# Patient Record
Sex: FEMALE | Race: WHITE | NOT HISPANIC OR LATINO
[De-identification: names, ages, dates, MRNs, and addresses within clinical notes are randomized per-mention and may not be internally consistent; named-entity substitution may affect disease eponyms.]

---

## 2018-09-25 ENCOUNTER — APPOINTMENT (OUTPATIENT)
Dept: BARIATRICS | Facility: CLINIC | Age: 53
End: 2018-09-25
Payer: COMMERCIAL

## 2018-09-25 ENCOUNTER — OUTPATIENT (OUTPATIENT)
Dept: OUTPATIENT SERVICES | Facility: HOSPITAL | Age: 53
LOS: 1 days | End: 2018-09-25
Payer: COMMERCIAL

## 2018-09-25 VITALS
DIASTOLIC BLOOD PRESSURE: 90 MMHG | HEART RATE: 77 BPM | HEIGHT: 62 IN | OXYGEN SATURATION: 99 % | WEIGHT: 172 LBS | SYSTOLIC BLOOD PRESSURE: 130 MMHG | BODY MASS INDEX: 31.65 KG/M2

## 2018-09-25 DIAGNOSIS — R63.5 ABNORMAL WEIGHT GAIN: ICD-10-CM

## 2018-09-25 DIAGNOSIS — Z98.84 BARIATRIC SURGERY STATUS: Chronic | ICD-10-CM

## 2018-09-25 DIAGNOSIS — E66.3 OVERWEIGHT: ICD-10-CM

## 2018-09-25 DIAGNOSIS — Z87.19 PERSONAL HISTORY OF OTHER DISEASES OF THE DIGESTIVE SYSTEM: ICD-10-CM

## 2018-09-25 DIAGNOSIS — E66.9 OBESITY, UNSPECIFIED: ICD-10-CM

## 2018-09-25 DIAGNOSIS — Z98.84 BARIATRIC SURGERY STATUS: ICD-10-CM

## 2018-09-25 PROCEDURE — 99214 OFFICE O/P EST MOD 30 MIN: CPT

## 2018-09-25 PROCEDURE — 74220 X-RAY XM ESOPHAGUS 1CNTRST: CPT

## 2018-09-25 PROCEDURE — 74220 X-RAY XM ESOPHAGUS 1CNTRST: CPT | Mod: 26

## 2018-09-25 RX ORDER — BACILLUS COAGULANS/INULIN 1B-250 MG
CAPSULE ORAL
Refills: 0 | Status: ACTIVE | COMMUNITY

## 2018-09-25 RX ORDER — MULTIVITAMIN
TABLET ORAL
Refills: 0 | Status: ACTIVE | COMMUNITY

## 2018-10-19 ENCOUNTER — APPOINTMENT (OUTPATIENT)
Dept: BARIATRICS | Facility: CLINIC | Age: 53
End: 2018-10-19
Payer: COMMERCIAL

## 2018-10-19 ENCOUNTER — OUTPATIENT (OUTPATIENT)
Dept: OUTPATIENT SERVICES | Facility: HOSPITAL | Age: 53
LOS: 1 days | End: 2018-10-19
Payer: COMMERCIAL

## 2018-10-19 VITALS
OXYGEN SATURATION: 98 % | WEIGHT: 173.06 LBS | HEART RATE: 97 BPM | SYSTOLIC BLOOD PRESSURE: 132 MMHG | DIASTOLIC BLOOD PRESSURE: 82 MMHG | HEIGHT: 62 IN | BODY MASS INDEX: 31.85 KG/M2

## 2018-10-19 VITALS
WEIGHT: 169.76 LBS | TEMPERATURE: 98 F | HEIGHT: 63 IN | OXYGEN SATURATION: 99 % | SYSTOLIC BLOOD PRESSURE: 120 MMHG | DIASTOLIC BLOOD PRESSURE: 88 MMHG | HEART RATE: 93 BPM

## 2018-10-19 DIAGNOSIS — T85.598A OTHER MECHANICAL COMPLICATION OF OTHER GASTROINTESTINAL PROSTHETIC DEVICES, IMPLANTS AND GRAFTS, INITIAL ENCOUNTER: ICD-10-CM

## 2018-10-19 DIAGNOSIS — Z98.84 BARIATRIC SURGERY STATUS: Chronic | ICD-10-CM

## 2018-10-19 DIAGNOSIS — Z98.84 BARIATRIC SURGERY STATUS: ICD-10-CM

## 2018-10-19 DIAGNOSIS — Z01.818 ENCOUNTER FOR OTHER PREPROCEDURAL EXAMINATION: ICD-10-CM

## 2018-10-19 DIAGNOSIS — K95.09 OTHER COMPLICATIONS OF GASTRIC BAND PROCEDURE: ICD-10-CM

## 2018-10-19 DIAGNOSIS — Z41.1 ENCOUNTER FOR COSMETIC SURGERY: Chronic | ICD-10-CM

## 2018-10-19 LAB
ANION GAP SERPL CALC-SCNC: 6 MMOL/L — SIGNIFICANT CHANGE UP (ref 5–17)
BLD GP AB SCN SERPL QL: SIGNIFICANT CHANGE UP
BUN SERPL-MCNC: 13 MG/DL — SIGNIFICANT CHANGE UP (ref 7–23)
CALCIUM SERPL-MCNC: 9.9 MG/DL — SIGNIFICANT CHANGE UP (ref 8.4–10.5)
CHLORIDE SERPL-SCNC: 103 MMOL/L — SIGNIFICANT CHANGE UP (ref 96–108)
CO2 SERPL-SCNC: 30 MMOL/L — SIGNIFICANT CHANGE UP (ref 22–31)
CREAT SERPL-MCNC: 0.73 MG/DL — SIGNIFICANT CHANGE UP (ref 0.5–1.3)
GLUCOSE SERPL-MCNC: 86 MG/DL — SIGNIFICANT CHANGE UP (ref 70–99)
HCT VFR BLD CALC: 43.9 % — SIGNIFICANT CHANGE UP (ref 34.5–45)
HGB BLD-MCNC: 14.6 G/DL — SIGNIFICANT CHANGE UP (ref 11.5–15.5)
MCHC RBC-ENTMCNC: 30.2 PG — SIGNIFICANT CHANGE UP (ref 27–34)
MCHC RBC-ENTMCNC: 33.3 GM/DL — SIGNIFICANT CHANGE UP (ref 32–36)
MCV RBC AUTO: 90.9 FL — SIGNIFICANT CHANGE UP (ref 80–100)
NRBC # BLD: 0 /100 WBCS — SIGNIFICANT CHANGE UP (ref 0–0)
PLATELET # BLD AUTO: 271 K/UL — SIGNIFICANT CHANGE UP (ref 150–400)
POTASSIUM SERPL-MCNC: 4.4 MMOL/L — SIGNIFICANT CHANGE UP (ref 3.5–5.3)
POTASSIUM SERPL-SCNC: 4.4 MMOL/L — SIGNIFICANT CHANGE UP (ref 3.5–5.3)
RBC # BLD: 4.83 M/UL — SIGNIFICANT CHANGE UP (ref 3.8–5.2)
RBC # FLD: 12.3 % — SIGNIFICANT CHANGE UP (ref 10.3–14.5)
SODIUM SERPL-SCNC: 139 MMOL/L — SIGNIFICANT CHANGE UP (ref 135–145)
WBC # BLD: 8.52 K/UL — SIGNIFICANT CHANGE UP (ref 3.8–10.5)
WBC # FLD AUTO: 8.52 K/UL — SIGNIFICANT CHANGE UP (ref 3.8–10.5)

## 2018-10-19 PROCEDURE — 85027 COMPLETE CBC AUTOMATED: CPT

## 2018-10-19 PROCEDURE — 86850 RBC ANTIBODY SCREEN: CPT

## 2018-10-19 PROCEDURE — G0463: CPT

## 2018-10-19 PROCEDURE — 93005 ELECTROCARDIOGRAM TRACING: CPT

## 2018-10-19 PROCEDURE — 80048 BASIC METABOLIC PNL TOTAL CA: CPT

## 2018-10-19 PROCEDURE — 36415 COLL VENOUS BLD VENIPUNCTURE: CPT

## 2018-10-19 PROCEDURE — 86900 BLOOD TYPING SEROLOGIC ABO: CPT

## 2018-10-19 PROCEDURE — 99214 OFFICE O/P EST MOD 30 MIN: CPT

## 2018-10-19 PROCEDURE — 86901 BLOOD TYPING SEROLOGIC RH(D): CPT

## 2018-10-19 PROCEDURE — 93010 ELECTROCARDIOGRAM REPORT: CPT

## 2018-10-19 RX ORDER — LOSARTAN POTASSIUM AND HYDROCHLOROTHIAZIDE 12.5; 5 MG/1; MG/1
50-12.5 TABLET ORAL
Refills: 0 | Status: ACTIVE | COMMUNITY

## 2018-10-19 NOTE — H&P PST ADULT - BREASTS
Denies known Latex allergy or symptoms of Latex sensitivity.  Medications verified, no changes.  History   Smoking Status   • Current Every Day Smoker   • Packs/day: 0.50   • Years: 10.00   • Types: Cigarettes   Smokeless Tobacco   • Never Used     Pt here for sore throat on R side  Onset yesterday   No cough      not examined

## 2018-10-19 NOTE — H&P PST ADULT - FAMILY HISTORY
Family history of heart disease     Father  Still living? No  Family history of hypertension, Age at diagnosis: Age Unknown     Mother  Still living? Yes, Estimated age: 71-80  Family history of hypertension, Age at diagnosis: 71-80

## 2018-10-19 NOTE — H&P PST ADULT - HISTORY OF PRESENT ILLNESS
This is a 54 y/o female who had undergone gastric band 2014 presents for removal lap band patient states that she was feelng hungry and This is a 54 y/o female who had undergone gastric band 2014 presents with complaint of increased hunger for several months. Denies dysphagia, port site pain, vomiting  or acid reflux.  Evaluated  by Dr Garcia, referred for endoscopy which revealed lap band erosion . Recommended removal of lap band . scheduled for laparoscopic removal of lap band on 10/24/18

## 2018-10-19 NOTE — H&P PST ADULT - NSANTHOSAYNRD_GEN_A_CORE
No. ANDRÉS screening performed.  STOP BANG Legend: 0-2 = LOW Risk; 3-4 = INTERMEDIATE Risk; 5-8 = HIGH Risk

## 2018-10-19 NOTE — H&P PST ADULT - PSH
Gastric banding status  with greater curvature plication   Plastic surgery for unacceptable cosmetic appearance  brachioplsty both arms  S/P abdominoplasty    S/P     S/P oophorectomy  (left ovary) Gastric banding status  with greater curvature plication   Plastic surgery for unacceptable cosmetic appearance  brachioplasty both arms 2017  S/P abdominoplasty    S/P     S/P oophorectomy  (left ovary)

## 2018-10-19 NOTE — H&P PST ADULT - PMH
GERD (gastroesophageal reflux disease)    Hiatal hernia    Hyperlipidemia    Hypertension    Obesity (BMI 35.0-39.9 without comorbidity) GERD (gastroesophageal reflux disease)    Hiatal hernia    Hyperlipidemia    Hypertension

## 2018-10-23 ENCOUNTER — TRANSCRIPTION ENCOUNTER (OUTPATIENT)
Age: 53
End: 2018-10-23

## 2018-10-23 NOTE — PATIENT PROFILE ADULT - REASON FOR REFUSAL (REFER PATIENT TO HEALTHCARE PROVIDER FOR FOLLOW-UP):
Pt home ambulatory with discharge instructions, pt verbalizes understanding of them   pt does not want it

## 2018-10-23 NOTE — PATIENT PROFILE ADULT - FUNCTIONAL SCREEN CURRENT LEVEL: BATHING, MLM
Addended by: ERIKA CASTANEDA on: 6/19/2017 02:03 PM     Modules accepted: Orders     4 = completely dependent

## 2018-10-24 ENCOUNTER — INPATIENT (INPATIENT)
Facility: HOSPITAL | Age: 53
LOS: 3 days | Discharge: HOME | DRG: 337 | End: 2018-10-28
Attending: SURGERY | Admitting: SURGERY
Payer: COMMERCIAL

## 2018-10-24 ENCOUNTER — RESULT REVIEW (OUTPATIENT)
Age: 53
End: 2018-10-24

## 2018-10-24 ENCOUNTER — APPOINTMENT (OUTPATIENT)
Dept: BARIATRICS | Facility: HOSPITAL | Age: 53
End: 2018-10-24
Payer: COMMERCIAL

## 2018-10-24 VITALS
HEART RATE: 80 BPM | WEIGHT: 169.76 LBS | RESPIRATION RATE: 13 BRPM | DIASTOLIC BLOOD PRESSURE: 85 MMHG | TEMPERATURE: 98 F | OXYGEN SATURATION: 100 % | SYSTOLIC BLOOD PRESSURE: 132 MMHG

## 2018-10-24 DIAGNOSIS — T85.598A OTHER MECHANICAL COMPLICATION OF OTHER GASTROINTESTINAL PROSTHETIC DEVICES, IMPLANTS AND GRAFTS, INITIAL ENCOUNTER: ICD-10-CM

## 2018-10-24 DIAGNOSIS — Z98.84 BARIATRIC SURGERY STATUS: ICD-10-CM

## 2018-10-24 DIAGNOSIS — Z98.84 BARIATRIC SURGERY STATUS: Chronic | ICD-10-CM

## 2018-10-24 DIAGNOSIS — Z41.1 ENCOUNTER FOR COSMETIC SURGERY: Chronic | ICD-10-CM

## 2018-10-24 LAB — HCG UR QL: NEGATIVE — SIGNIFICANT CHANGE UP

## 2018-10-24 PROCEDURE — 43774 LAP RMVL GASTR ADJ ALL PARTS: CPT | Mod: AS

## 2018-10-24 PROCEDURE — 43774 LAP RMVL GASTR ADJ ALL PARTS: CPT

## 2018-10-24 PROCEDURE — 43659 UNLISTED LAPS PX STOMACH: CPT | Mod: AS

## 2018-10-24 PROCEDURE — 88302 TISSUE EXAM BY PATHOLOGIST: CPT | Mod: 26

## 2018-10-24 PROCEDURE — 43659 UNLISTED LAPS PX STOMACH: CPT

## 2018-10-24 RX ORDER — CEFAZOLIN SODIUM 1 G
2000 VIAL (EA) INJECTION ONCE
Qty: 0 | Refills: 0 | Status: COMPLETED | OUTPATIENT
Start: 2018-10-24 | End: 2018-10-24

## 2018-10-24 RX ORDER — ACETAMINOPHEN 500 MG
1000 TABLET ORAL EVERY 6 HOURS
Qty: 0 | Refills: 0 | Status: COMPLETED | OUTPATIENT
Start: 2018-10-24 | End: 2018-10-25

## 2018-10-24 RX ORDER — ONDANSETRON 8 MG/1
4 TABLET, FILM COATED ORAL EVERY 6 HOURS
Qty: 0 | Refills: 0 | Status: DISCONTINUED | OUTPATIENT
Start: 2018-10-24 | End: 2018-10-28

## 2018-10-24 RX ORDER — IBUPROFEN 200 MG
800 TABLET ORAL EVERY 6 HOURS
Qty: 0 | Refills: 0 | Status: DISCONTINUED | OUTPATIENT
Start: 2018-10-24 | End: 2018-10-28

## 2018-10-24 RX ORDER — SODIUM CHLORIDE 9 MG/ML
1000 INJECTION, SOLUTION INTRAVENOUS
Qty: 0 | Refills: 0 | Status: DISCONTINUED | OUTPATIENT
Start: 2018-10-24 | End: 2018-10-24

## 2018-10-24 RX ORDER — ENOXAPARIN SODIUM 100 MG/ML
40 INJECTION SUBCUTANEOUS ONCE
Qty: 0 | Refills: 0 | Status: COMPLETED | OUTPATIENT
Start: 2018-10-24 | End: 2018-10-24

## 2018-10-24 RX ORDER — HYDROMORPHONE HYDROCHLORIDE 2 MG/ML
0.5 INJECTION INTRAMUSCULAR; INTRAVENOUS; SUBCUTANEOUS EVERY 4 HOURS
Qty: 0 | Refills: 0 | Status: DISCONTINUED | OUTPATIENT
Start: 2018-10-24 | End: 2018-10-28

## 2018-10-24 RX ORDER — HYDROMORPHONE HYDROCHLORIDE 2 MG/ML
0.5 INJECTION INTRAMUSCULAR; INTRAVENOUS; SUBCUTANEOUS
Qty: 0 | Refills: 0 | Status: DISCONTINUED | OUTPATIENT
Start: 2018-10-24 | End: 2018-10-24

## 2018-10-24 RX ORDER — APREPITANT 80 MG/1
40 CAPSULE ORAL ONCE
Qty: 0 | Refills: 0 | Status: COMPLETED | OUTPATIENT
Start: 2018-10-24 | End: 2018-10-24

## 2018-10-24 RX ORDER — METRONIDAZOLE 500 MG
500 TABLET ORAL EVERY 8 HOURS
Qty: 0 | Refills: 0 | Status: COMPLETED | OUTPATIENT
Start: 2018-10-24 | End: 2018-10-26

## 2018-10-24 RX ORDER — SODIUM CHLORIDE 9 MG/ML
1000 INJECTION, SOLUTION INTRAVENOUS
Qty: 0 | Refills: 0 | Status: DISCONTINUED | OUTPATIENT
Start: 2018-10-24 | End: 2018-10-26

## 2018-10-24 RX ORDER — ONDANSETRON 8 MG/1
4 TABLET, FILM COATED ORAL ONCE
Qty: 0 | Refills: 0 | Status: DISCONTINUED | OUTPATIENT
Start: 2018-10-24 | End: 2018-10-24

## 2018-10-24 RX ORDER — CHLORHEXIDINE GLUCONATE 213 G/1000ML
1 SOLUTION TOPICAL ONCE
Qty: 0 | Refills: 0 | Status: COMPLETED | OUTPATIENT
Start: 2018-10-24 | End: 2018-10-24

## 2018-10-24 RX ORDER — PANTOPRAZOLE SODIUM 20 MG/1
40 TABLET, DELAYED RELEASE ORAL DAILY
Qty: 0 | Refills: 0 | Status: DISCONTINUED | OUTPATIENT
Start: 2018-10-24 | End: 2018-10-25

## 2018-10-24 RX ORDER — ENOXAPARIN SODIUM 100 MG/ML
40 INJECTION SUBCUTANEOUS EVERY 12 HOURS
Qty: 0 | Refills: 0 | Status: DISCONTINUED | OUTPATIENT
Start: 2018-10-24 | End: 2018-10-28

## 2018-10-24 RX ORDER — ACETAMINOPHEN 500 MG
1000 TABLET ORAL EVERY 6 HOURS
Qty: 0 | Refills: 0 | Status: DISCONTINUED | OUTPATIENT
Start: 2018-10-25 | End: 2018-10-25

## 2018-10-24 RX ORDER — HYOSCYAMINE SULFATE 0.13 MG
0.12 TABLET ORAL EVERY 6 HOURS
Qty: 0 | Refills: 0 | Status: DISCONTINUED | OUTPATIENT
Start: 2018-10-24 | End: 2018-10-28

## 2018-10-24 RX ORDER — ACETAMINOPHEN 500 MG
1000 TABLET ORAL ONCE
Qty: 0 | Refills: 0 | Status: COMPLETED | OUTPATIENT
Start: 2018-10-24 | End: 2018-10-24

## 2018-10-24 RX ORDER — CIPROFLOXACIN LACTATE 400MG/40ML
400 VIAL (ML) INTRAVENOUS EVERY 12 HOURS
Qty: 0 | Refills: 0 | Status: COMPLETED | OUTPATIENT
Start: 2018-10-24 | End: 2018-10-26

## 2018-10-24 RX ADMIN — HYDROMORPHONE HYDROCHLORIDE 0.5 MILLIGRAM(S): 2 INJECTION INTRAMUSCULAR; INTRAVENOUS; SUBCUTANEOUS at 17:22

## 2018-10-24 RX ADMIN — Medication 200 MILLIGRAM(S): at 17:59

## 2018-10-24 RX ADMIN — SODIUM CHLORIDE 1000 MILLILITER(S): 9 INJECTION, SOLUTION INTRAVENOUS at 10:20

## 2018-10-24 RX ADMIN — HYDROMORPHONE HYDROCHLORIDE 0.5 MILLIGRAM(S): 2 INJECTION INTRAMUSCULAR; INTRAVENOUS; SUBCUTANEOUS at 15:24

## 2018-10-24 RX ADMIN — HYDROMORPHONE HYDROCHLORIDE 0.5 MILLIGRAM(S): 2 INJECTION INTRAMUSCULAR; INTRAVENOUS; SUBCUTANEOUS at 15:38

## 2018-10-24 RX ADMIN — Medication 0.12 MILLIGRAM(S): at 19:51

## 2018-10-24 RX ADMIN — Medication 400 MILLIGRAM(S): at 19:30

## 2018-10-24 RX ADMIN — ONDANSETRON 4 MILLIGRAM(S): 8 TABLET, FILM COATED ORAL at 17:22

## 2018-10-24 RX ADMIN — HYDROMORPHONE HYDROCHLORIDE 0.5 MILLIGRAM(S): 2 INJECTION INTRAMUSCULAR; INTRAVENOUS; SUBCUTANEOUS at 17:35

## 2018-10-24 RX ADMIN — APREPITANT 40 MILLIGRAM(S): 80 CAPSULE ORAL at 10:20

## 2018-10-24 RX ADMIN — ENOXAPARIN SODIUM 40 MILLIGRAM(S): 100 INJECTION SUBCUTANEOUS at 10:21

## 2018-10-24 RX ADMIN — Medication 1000 MILLIGRAM(S): at 19:44

## 2018-10-24 RX ADMIN — Medication 100 MILLIGRAM(S): at 16:03

## 2018-10-24 RX ADMIN — ENOXAPARIN SODIUM 40 MILLIGRAM(S): 100 INJECTION SUBCUTANEOUS at 21:55

## 2018-10-24 RX ADMIN — SODIUM CHLORIDE 100 MILLILITER(S): 9 INJECTION, SOLUTION INTRAVENOUS at 15:24

## 2018-10-24 RX ADMIN — CHLORHEXIDINE GLUCONATE 1 APPLICATION(S): 213 SOLUTION TOPICAL at 10:21

## 2018-10-24 NOTE — BRIEF OPERATIVE NOTE - PROCEDURE
<<-----Click on this checkbox to enter Procedure Laparoscopic removal of adjustable gastric band and subcutaneous gastric port device  10/24/2018  lysis of adhesions greater than 45 minutes  EDG x2  Active  LWERMELING

## 2018-10-25 ENCOUNTER — TRANSCRIPTION ENCOUNTER (OUTPATIENT)
Age: 53
End: 2018-10-25

## 2018-10-25 LAB
ANION GAP SERPL CALC-SCNC: 8 MMOL/L — SIGNIFICANT CHANGE UP (ref 5–17)
BUN SERPL-MCNC: 10 MG/DL — SIGNIFICANT CHANGE UP (ref 7–23)
CALCIUM SERPL-MCNC: 8.4 MG/DL — SIGNIFICANT CHANGE UP (ref 8.4–10.5)
CHLORIDE SERPL-SCNC: 104 MMOL/L — SIGNIFICANT CHANGE UP (ref 96–108)
CO2 SERPL-SCNC: 26 MMOL/L — SIGNIFICANT CHANGE UP (ref 22–31)
CREAT SERPL-MCNC: 0.84 MG/DL — SIGNIFICANT CHANGE UP (ref 0.5–1.3)
GLUCOSE SERPL-MCNC: 120 MG/DL — HIGH (ref 70–99)
HCT VFR BLD CALC: 31.4 % — LOW (ref 34.5–45)
HGB BLD-MCNC: 10.4 G/DL — LOW (ref 11.5–15.5)
MCHC RBC-ENTMCNC: 30.4 PG — SIGNIFICANT CHANGE UP (ref 27–34)
MCHC RBC-ENTMCNC: 33.1 GM/DL — SIGNIFICANT CHANGE UP (ref 32–36)
MCV RBC AUTO: 91.8 FL — SIGNIFICANT CHANGE UP (ref 80–100)
NRBC # BLD: 0 /100 WBCS — SIGNIFICANT CHANGE UP (ref 0–0)
PLATELET # BLD AUTO: 162 K/UL — SIGNIFICANT CHANGE UP (ref 150–400)
POTASSIUM SERPL-MCNC: 3.6 MMOL/L — SIGNIFICANT CHANGE UP (ref 3.5–5.3)
POTASSIUM SERPL-SCNC: 3.6 MMOL/L — SIGNIFICANT CHANGE UP (ref 3.5–5.3)
RBC # BLD: 3.42 M/UL — LOW (ref 3.8–5.2)
RBC # FLD: 12.4 % — SIGNIFICANT CHANGE UP (ref 10.3–14.5)
SODIUM SERPL-SCNC: 138 MMOL/L — SIGNIFICANT CHANGE UP (ref 135–145)
WBC # BLD: 8.21 K/UL — SIGNIFICANT CHANGE UP (ref 3.8–10.5)
WBC # FLD AUTO: 8.21 K/UL — SIGNIFICANT CHANGE UP (ref 3.8–10.5)

## 2018-10-25 RX ORDER — PANTOPRAZOLE SODIUM 20 MG/1
40 TABLET, DELAYED RELEASE ORAL
Qty: 0 | Refills: 0 | Status: DISCONTINUED | OUTPATIENT
Start: 2018-10-25 | End: 2018-10-28

## 2018-10-25 RX ORDER — ACETAMINOPHEN 500 MG
1000 TABLET ORAL EVERY 6 HOURS
Qty: 0 | Refills: 0 | Status: COMPLETED | OUTPATIENT
Start: 2018-10-25 | End: 2018-10-26

## 2018-10-25 RX ORDER — SODIUM CHLORIDE 9 MG/ML
1000 INJECTION INTRAMUSCULAR; INTRAVENOUS; SUBCUTANEOUS ONCE
Qty: 0 | Refills: 0 | Status: COMPLETED | OUTPATIENT
Start: 2018-10-25 | End: 2018-10-25

## 2018-10-25 RX ORDER — SODIUM CHLORIDE 9 MG/ML
1000 INJECTION, SOLUTION INTRAVENOUS ONCE
Qty: 0 | Refills: 0 | Status: COMPLETED | OUTPATIENT
Start: 2018-10-25 | End: 2018-10-25

## 2018-10-25 RX ORDER — FAMOTIDINE 10 MG/ML
20 INJECTION INTRAVENOUS ONCE
Qty: 0 | Refills: 0 | Status: COMPLETED | OUTPATIENT
Start: 2018-10-25 | End: 2018-10-25

## 2018-10-25 RX ORDER — FAMOTIDINE 10 MG/ML
20 INJECTION INTRAVENOUS ONCE
Qty: 0 | Refills: 0 | Status: DISCONTINUED | OUTPATIENT
Start: 2018-10-25 | End: 2018-10-28

## 2018-10-25 RX ADMIN — ONDANSETRON 4 MILLIGRAM(S): 8 TABLET, FILM COATED ORAL at 05:38

## 2018-10-25 RX ADMIN — Medication 400 MILLIGRAM(S): at 00:07

## 2018-10-25 RX ADMIN — ENOXAPARIN SODIUM 40 MILLIGRAM(S): 100 INJECTION SUBCUTANEOUS at 11:30

## 2018-10-25 RX ADMIN — HYDROMORPHONE HYDROCHLORIDE 0.5 MILLIGRAM(S): 2 INJECTION INTRAMUSCULAR; INTRAVENOUS; SUBCUTANEOUS at 17:30

## 2018-10-25 RX ADMIN — SODIUM CHLORIDE 500 MILLILITER(S): 9 INJECTION INTRAMUSCULAR; INTRAVENOUS; SUBCUTANEOUS at 07:10

## 2018-10-25 RX ADMIN — Medication 200 MILLIGRAM(S): at 19:31

## 2018-10-25 RX ADMIN — ONDANSETRON 4 MILLIGRAM(S): 8 TABLET, FILM COATED ORAL at 17:32

## 2018-10-25 RX ADMIN — Medication 516 MILLIGRAM(S): at 01:49

## 2018-10-25 RX ADMIN — Medication 100 MILLIGRAM(S): at 00:06

## 2018-10-25 RX ADMIN — Medication 400 MILLIGRAM(S): at 07:29

## 2018-10-25 RX ADMIN — ENOXAPARIN SODIUM 40 MILLIGRAM(S): 100 INJECTION SUBCUTANEOUS at 21:41

## 2018-10-25 RX ADMIN — HYDROMORPHONE HYDROCHLORIDE 0.5 MILLIGRAM(S): 2 INJECTION INTRAMUSCULAR; INTRAVENOUS; SUBCUTANEOUS at 21:33

## 2018-10-25 RX ADMIN — PANTOPRAZOLE SODIUM 40 MILLIGRAM(S): 20 TABLET, DELAYED RELEASE ORAL at 14:01

## 2018-10-25 RX ADMIN — Medication 1000 MILLIGRAM(S): at 07:29

## 2018-10-25 RX ADMIN — Medication 200 MILLIGRAM(S): at 05:38

## 2018-10-25 RX ADMIN — Medication 800 MILLIGRAM(S): at 02:06

## 2018-10-25 RX ADMIN — PANTOPRAZOLE SODIUM 40 MILLIGRAM(S): 20 TABLET, DELAYED RELEASE ORAL at 21:41

## 2018-10-25 RX ADMIN — ONDANSETRON 4 MILLIGRAM(S): 8 TABLET, FILM COATED ORAL at 23:27

## 2018-10-25 RX ADMIN — FAMOTIDINE 20 MILLIGRAM(S): 10 INJECTION INTRAVENOUS at 10:52

## 2018-10-25 RX ADMIN — HYDROMORPHONE HYDROCHLORIDE 0.5 MILLIGRAM(S): 2 INJECTION INTRAMUSCULAR; INTRAVENOUS; SUBCUTANEOUS at 04:10

## 2018-10-25 RX ADMIN — Medication 100 MILLIGRAM(S): at 23:27

## 2018-10-25 RX ADMIN — SODIUM CHLORIDE 500 MILLILITER(S): 9 INJECTION, SOLUTION INTRAVENOUS at 10:32

## 2018-10-25 RX ADMIN — Medication 100 MILLIGRAM(S): at 17:32

## 2018-10-25 RX ADMIN — HYDROMORPHONE HYDROCHLORIDE 0.5 MILLIGRAM(S): 2 INJECTION INTRAMUSCULAR; INTRAVENOUS; SUBCUTANEOUS at 11:56

## 2018-10-25 RX ADMIN — HYDROMORPHONE HYDROCHLORIDE 0.5 MILLIGRAM(S): 2 INJECTION INTRAMUSCULAR; INTRAVENOUS; SUBCUTANEOUS at 18:00

## 2018-10-25 RX ADMIN — Medication 1000 MILLIGRAM(S): at 00:07

## 2018-10-25 RX ADMIN — ONDANSETRON 4 MILLIGRAM(S): 8 TABLET, FILM COATED ORAL at 11:30

## 2018-10-25 RX ADMIN — Medication 100 MILLIGRAM(S): at 10:54

## 2018-10-25 RX ADMIN — HYDROMORPHONE HYDROCHLORIDE 0.5 MILLIGRAM(S): 2 INJECTION INTRAMUSCULAR; INTRAVENOUS; SUBCUTANEOUS at 11:26

## 2018-10-25 RX ADMIN — HYDROMORPHONE HYDROCHLORIDE 0.5 MILLIGRAM(S): 2 INJECTION INTRAMUSCULAR; INTRAVENOUS; SUBCUTANEOUS at 03:55

## 2018-10-25 RX ADMIN — HYDROMORPHONE HYDROCHLORIDE 0.5 MILLIGRAM(S): 2 INJECTION INTRAMUSCULAR; INTRAVENOUS; SUBCUTANEOUS at 21:48

## 2018-10-25 RX ADMIN — ONDANSETRON 4 MILLIGRAM(S): 8 TABLET, FILM COATED ORAL at 00:07

## 2018-10-25 NOTE — DISCHARGE NOTE ADULT - HOSPITAL COURSE
53 year old female with history of lap band erosion found on upper EGD now s/p laparoscopic removal of lap band and port, repair of gastrotomy and intraoperative EGD. Post operatively patient did well, ambulating well on floor. On POD #1, osuna catheter removed with subsequent good urine output. Patient remained NPO until video esophagram with gastrografin on POD#2. VE showed no evidence of leak and patient tolerated advancement of diet to bariatric clears.  Patient felt ready for discharge to home. Pt instructed to drink small frequent amounts, start protein drinks and follow dietary guidelines. 53 year old female with history of lap band erosion found on upper EGD now s/p laparoscopic removal of lap band and port, repair of gastrotomy and intraoperative EGD. Post operatively patient did well, ambulating well on floor. On POD #1, osuna catheter removed with subsequent good urine output. Patient remained NPO until video esophagram with gastrografin on POD#2. VE showed no evidence of leak and patient tolerated advancement of diet to bariatric clears.  Patient felt ready for discharge to home. Pt instructed to drink small frequent amounts, start full liquids the day after discharge.

## 2018-10-25 NOTE — DISCHARGE NOTE ADULT - PLAN OF CARE
Removal of lap band and port and repair of gastrotomy Instructed to ambulate and use incentive spirometry frequently. Ice packs to abdominal wall and shoulders as needed for discomfort. Full liquids including thin yogurts and pureed soups. Pain meds as needed. Pt instructed  to follow up with Dr. Garcia in 1 week.

## 2018-10-25 NOTE — DISCHARGE NOTE ADULT - NS AS ACTIVITY OBS
Do not make important decisions/Do not drive or operate machinery/Walking-Indoors allowed/No Heavy lifting/straining/Showering allowed/Walking-Outdoors allowed

## 2018-10-25 NOTE — PROGRESS NOTE ADULT - ATTENDING COMMENTS
Agree with above.  Patient seen and examined.  Good urine output, ambulating, labs ok.  VE with gastrograffin in AM. Repeat labs in AM.  OOB ambulating, incentive spirometer, possible d/c Friday or Saturday.

## 2018-10-25 NOTE — DISCHARGE NOTE ADULT - FINDINGS/TREATMENT
Tolerated procedure well. Tolerated procedure well. Required repair of gastrotomy created after removal of lap band. Upper EGD showed no evidence of leak.

## 2018-10-25 NOTE — PROGRESS NOTE ADULT - SUBJECTIVE AND OBJECTIVE BOX
BARIATRIC SURGERY     Pre-Op Dx: Morbid obesity/Bariatric Surgery Status.  Procedure: S/P Removal of Lap Band and Port Secondary to Lap Band Erosion/ Lysis of Adhesions.  Surgeon: Jose COOK      Subjective:    53y Female post op day # 1 s/p  S/P Removal of Lap Band and Port Secondary to Lap Band Erosion/ Lysis of Adhesions. Minimal incisional discomfort. Denies any nausea or vomiting. Denies any chest pain or shortness of breath. Patient is currently NPO until tomorrow AM. Plan to have a Video Esophagram tomorrow morning. Ambulating on Floor/ Solares catheter was removed at 4:30 am this am. Pt voided > 250 CC - urine this am. Received a 1 liter bolus this am. Plan to have a bladder scan this am.  Utilizing incentive spirometry as instructed.     LABS:                        10.4   8.21  )-----------( 162      ( 25 Oct 2018 08:00 )             31.4     10-25    138  |  104  |  10  ----------------------------<  120<H>  3.6   |  26  |  0.84    Ca    8.4      25 Oct 2018 08:00        Vital Signs Last 24 Hrs  T(C): 36.7 (25 Oct 2018 07:45), Max: 36.8 (24 Oct 2018 16:10)  T(F): 98 (25 Oct 2018 07:45), Max: 98.3 (24 Oct 2018 16:10)  HR: 68 (25 Oct 2018 07:45) (68 - 89)  BP: 113/76 (25 Oct 2018 07:45) (95/64 - 132/83)  BP(mean): --  RR: 18 (25 Oct 2018 07:45) (9 - 19)  SpO2: 97% (25 Oct 2018 07:45) (94% - 100%)    10-24 @ 07:01  -  10-25 @ 07:00  --------------------------------------------------------  IN: 2000 mL / OUT: 1150 mL / NET: 850 mL    10-25 @ 07:01  -  10-25 @ 09:35  --------------------------------------------------------  IN: 0 mL / OUT: 200 mL / NET: -200 mL        Physical Exam:  General: Alert & Oriented x 3.  NAD, resting comfortably in bed.    Abdominal: Soft - Non tender - No swelling noted. Incision site clean / dry /intact. Normoactive Bowel Sounds.  Extremities: No swelling/ edema / erythema noted bilateral upper / lower extremities.  Neuro: Motor / Sensory function grossly intact bilateral lower/ upper extremities.          Assessment:53 y Female Post OP Day # 1 S/P Removal of Lap Band and Port Secondary to Lap Band Erosion/ Lysis of Adhesions. Pt is hemodynamically stable. Currently NPO. Bladder scan performed ~ 20 CC of urine recorded.       Plan:  Cont GI / DVT prophylaxis.   Dietician consult.   Cont  OOB / ambulation on floor / incentive spirometry.  Continue NPO until tomorrow am. Will have a Video Esophagram with Gastrogaffin tomorrow am . Will start clear liquids pending study.  Discussed and reviewed home meds  and pain medication with patient . Discussed medication that requires crushing.  Will continue to monitor urine output. Will repeat bladder sonogram if necessary.   Dr. Garcia saw pt.

## 2018-10-25 NOTE — PROGRESS NOTE ADULT - SUBJECTIVE AND OBJECTIVE BOX
post op day 1 -removal of eroded lap band.    intra op EGD   walking in hallway. c/o dyspepsia  VSS abd soft, obese. mildy tender         cv s1s2        chest air entry  labs reviewed, hct down    a/p Post op day 1 Lap removal of eroded band, HODAN    intra-op EGD performed.    Pepcid IV now x 1. cont w/ Protonix daily   bowel rest.    cont w/ ambulating  -followup hgb-noted drop  diet per surgery

## 2018-10-25 NOTE — DISCHARGE NOTE ADULT - INSTRUCTIONS
Instructed to ambulate and use incentive spirometry frequently. Ice packs to abdominal wall and shoulders as needed for discomfort. Full liquids including thin yogurts and pureed soups. Pain meds as needed. Pt instructed  to follow up with Dr. Garcia in 1 week. Instructed to ambulate and use incentive spirometry frequently. Ice packs to abdominal wall and shoulders as needed for discomfort. Day of discharge, continue clear liquids and then next day, start full liquids including thin yogurts and pureed soups. Pain meds as needed. Pt instructed  to follow up with Dr. Garcia on Tuesday. Instructed to ambulate and use incentive spirometry frequently (10 times every hour awake). Ice packs to abdominal wall and shoulders as needed for discomfort. Day of discharge, continue clear liquids and then next day, start full liquids including thin yogurts and pureed soups. Pain meds as needed. Pt instructed  to follow up with Dr. Garcia on Tuesday. abdomen scope sites

## 2018-10-25 NOTE — DISCHARGE NOTE ADULT - CARE PLAN
Principal Discharge DX:	Erosion of gastric band  Goal:	Removal of lap band and port and repair of gastrotomy  Assessment and plan of treatment:	Instructed to ambulate and use incentive spirometry frequently. Ice packs to abdominal wall and shoulders as needed for discomfort. Full liquids including thin yogurts and pureed soups. Pain meds as needed. Pt instructed  to follow up with Dr. Garcia in 1 week.

## 2018-10-25 NOTE — DIETITIAN INITIAL EVALUATION ADULT. - OTHER INFO
53F adm from home for removal of eroded lap band and port.  NPO status maintained c IVF infusing @150ml/hr.  Pt reports abd pain and acid reflux currently.  Noted pending esophagram tomorrow am.  Reinforced diet initiation will be through surgeon.  Provided diet progression education for lap band removal (written/verbal).

## 2018-10-25 NOTE — DISCHARGE NOTE ADULT - MEDICATION SUMMARY - MEDICATIONS TO TAKE
I will START or STAY ON the medications listed below when I get home from the hospital:    oxyCODONE-acetaminophen 5 mg-325 mg oral tablet  -- 1 tab(s) by mouth every 6 hours, As Needed -for moderate pain, crush and put in low fat yogurt or pudding. MDD:4 tabs  -- Caution federal law prohibits the transfer of this drug to any person other  than the person for whom it was prescribed.  May cause drowsiness.  Alcohol may intensify this effect.  Use care when operating dangerous machinery.  This prescription cannot be refilled.  This product contains acetaminophen.  Do not use  with any other product containing acetaminophen to prevent possible liver damage.  Using more of this medication than prescribed may cause serious breathing problems.    -- Indication: For Status post bariatric surgery    losartan 50 mg oral tablet  -- 1 tab(s) by mouth once a day, crush and put in low fat yogurt or pudding.   -- Do not take this drug if you are pregnant.  It is very important that you take or use this exactly as directed.  Do not skip doses or discontinue unless directed by your doctor.  Some non-prescription drugs may aggravate your condition.  Read all labels carefully.  If a warning appears, check with your doctor before taking.    -- Indication: For hypertension    ondansetron 4 mg oral tablet, disintegrating  -- 1 tab(s) by mouth every 8 hours, As Needed -for nausea   -- Indication: For Status post bariatric surgery    hyoscyamine 0.125 mg oral tablet, disintegrating  -- 1 tab(s) by mouth 4 times a day, As Needed -for nausea   -- May cause drowsiness or dizziness.  This drug may impair the ability to drive or operate machinery.  Use care until you become familiar with its effects.    -- Indication: For Status post bariatric surgery    amoxicillin-clavulanate 250 mg-62.5 mg/5 mL oral liquid  -- 10 milliliter(s) by mouth every 8 hours until Wednesday Oct 31  -- Expires___________________  Finish all this medication unless otherwise directed by prescriber.  Refrigerate and shake well.  Expires_______________________  Take with food or milk.    -- Indication: For Erosion of gastric band    lactobacillus acidophilus oral capsule  -- 1 cap(s) by mouth every 8 hours, open and put contents in low fat yogurt or pudding   -- Indication: For probiotic    omeprazole 20 mg oral delayed release capsule  -- 1 cap(s) by mouth 2 times a day, open and put contents in low fat yogurt or pudding   -- It is very important that you take or use this exactly as directed.  Do not skip doses or discontinue unless directed by your doctor.  Obtain medical advice before taking any non-prescription drugs as some may affect the action of this medication.  Swallow whole.  Do not crush.    -- Indication: For Status post bariatric surgery

## 2018-10-25 NOTE — DISCHARGE NOTE ADULT - PATIENT PORTAL LINK FT
You can access the [facility] Patient Portal, offered by [facility], by registering with the following website: [email or web address]

## 2018-10-26 DIAGNOSIS — K95.09 OTHER COMPLICATIONS OF GASTRIC BAND PROCEDURE: ICD-10-CM

## 2018-10-26 LAB
ANION GAP SERPL CALC-SCNC: 12 MMOL/L — SIGNIFICANT CHANGE UP (ref 5–17)
BUN SERPL-MCNC: 6 MG/DL — LOW (ref 7–23)
CALCIUM SERPL-MCNC: 8.6 MG/DL — SIGNIFICANT CHANGE UP (ref 8.4–10.5)
CHLORIDE SERPL-SCNC: 104 MMOL/L — SIGNIFICANT CHANGE UP (ref 96–108)
CO2 SERPL-SCNC: 28 MMOL/L — SIGNIFICANT CHANGE UP (ref 22–31)
CREAT SERPL-MCNC: 0.82 MG/DL — SIGNIFICANT CHANGE UP (ref 0.5–1.3)
GLUCOSE SERPL-MCNC: 101 MG/DL — HIGH (ref 70–99)
HCT VFR BLD CALC: 30.8 % — LOW (ref 34.5–45)
HGB BLD-MCNC: 10.1 G/DL — LOW (ref 11.5–15.5)
MCHC RBC-ENTMCNC: 30.3 PG — SIGNIFICANT CHANGE UP (ref 27–34)
MCHC RBC-ENTMCNC: 32.8 GM/DL — SIGNIFICANT CHANGE UP (ref 32–36)
MCV RBC AUTO: 92.5 FL — SIGNIFICANT CHANGE UP (ref 80–100)
NRBC # BLD: 0 /100 WBCS — SIGNIFICANT CHANGE UP (ref 0–0)
PLATELET # BLD AUTO: 170 K/UL — SIGNIFICANT CHANGE UP (ref 150–400)
POTASSIUM SERPL-MCNC: 3.5 MMOL/L — SIGNIFICANT CHANGE UP (ref 3.5–5.3)
POTASSIUM SERPL-SCNC: 3.5 MMOL/L — SIGNIFICANT CHANGE UP (ref 3.5–5.3)
RBC # BLD: 3.33 M/UL — LOW (ref 3.8–5.2)
RBC # FLD: 12.3 % — SIGNIFICANT CHANGE UP (ref 10.3–14.5)
SODIUM SERPL-SCNC: 144 MMOL/L — SIGNIFICANT CHANGE UP (ref 135–145)
SURGICAL PATHOLOGY FINAL REPORT - CH: SIGNIFICANT CHANGE UP
WBC # BLD: 9.29 K/UL — SIGNIFICANT CHANGE UP (ref 3.8–10.5)
WBC # FLD AUTO: 9.29 K/UL — SIGNIFICANT CHANGE UP (ref 3.8–10.5)

## 2018-10-26 PROCEDURE — 74220 X-RAY XM ESOPHAGUS 1CNTRST: CPT | Mod: 26

## 2018-10-26 RX ORDER — ONDANSETRON 8 MG/1
4 TABLET, FILM COATED ORAL ONCE
Qty: 0 | Refills: 0 | Status: COMPLETED | OUTPATIENT
Start: 2018-10-26 | End: 2018-10-26

## 2018-10-26 RX ORDER — HYOSCYAMINE SULFATE 0.13 MG
1 TABLET ORAL
Qty: 20 | Refills: 0 | OUTPATIENT
Start: 2018-10-26 | End: 2018-10-30

## 2018-10-26 RX ORDER — CEFAZOLIN SODIUM 1 G
2000 VIAL (EA) INJECTION EVERY 8 HOURS
Qty: 0 | Refills: 0 | Status: DISCONTINUED | OUTPATIENT
Start: 2018-10-26 | End: 2018-10-28

## 2018-10-26 RX ORDER — LACTOBACILLUS ACIDOPHILUS 100MM CELL
1 CAPSULE ORAL
Qty: 5 | Refills: 0 | OUTPATIENT
Start: 2018-10-26

## 2018-10-26 RX ORDER — LOSARTAN POTASSIUM 100 MG/1
1 TABLET, FILM COATED ORAL
Qty: 30 | Refills: 0 | OUTPATIENT
Start: 2018-10-26 | End: 2018-11-24

## 2018-10-26 RX ORDER — OMEPRAZOLE 10 MG/1
1 CAPSULE, DELAYED RELEASE ORAL
Qty: 60 | Refills: 0 | OUTPATIENT
Start: 2018-10-26 | End: 2018-11-24

## 2018-10-26 RX ORDER — SODIUM CHLORIDE 9 MG/ML
1000 INJECTION, SOLUTION INTRAVENOUS
Qty: 0 | Refills: 0 | Status: DISCONTINUED | OUTPATIENT
Start: 2018-10-26 | End: 2018-10-28

## 2018-10-26 RX ORDER — ONDANSETRON 8 MG/1
1 TABLET, FILM COATED ORAL
Qty: 15 | Refills: 0 | OUTPATIENT
Start: 2018-10-26

## 2018-10-26 RX ORDER — LACTOBACILLUS ACIDOPHILUS 100MM CELL
1 CAPSULE ORAL EVERY 8 HOURS
Qty: 0 | Refills: 0 | Status: DISCONTINUED | OUTPATIENT
Start: 2018-10-26 | End: 2018-10-28

## 2018-10-26 RX ORDER — LOSARTAN/HYDROCHLOROTHIAZIDE 100MG-25MG
1 TABLET ORAL
Qty: 0 | Refills: 0 | COMMUNITY

## 2018-10-26 RX ADMIN — Medication 100 MILLIGRAM(S): at 09:01

## 2018-10-26 RX ADMIN — HYDROMORPHONE HYDROCHLORIDE 0.5 MILLIGRAM(S): 2 INJECTION INTRAMUSCULAR; INTRAVENOUS; SUBCUTANEOUS at 19:10

## 2018-10-26 RX ADMIN — ONDANSETRON 4 MILLIGRAM(S): 8 TABLET, FILM COATED ORAL at 09:42

## 2018-10-26 RX ADMIN — Medication 0.12 MILLIGRAM(S): at 08:08

## 2018-10-26 RX ADMIN — HYDROMORPHONE HYDROCHLORIDE 0.5 MILLIGRAM(S): 2 INJECTION INTRAMUSCULAR; INTRAVENOUS; SUBCUTANEOUS at 18:53

## 2018-10-26 RX ADMIN — ONDANSETRON 4 MILLIGRAM(S): 8 TABLET, FILM COATED ORAL at 05:59

## 2018-10-26 RX ADMIN — HYDROMORPHONE HYDROCHLORIDE 0.5 MILLIGRAM(S): 2 INJECTION INTRAMUSCULAR; INTRAVENOUS; SUBCUTANEOUS at 11:42

## 2018-10-26 RX ADMIN — PANTOPRAZOLE SODIUM 40 MILLIGRAM(S): 20 TABLET, DELAYED RELEASE ORAL at 17:55

## 2018-10-26 RX ADMIN — PANTOPRAZOLE SODIUM 40 MILLIGRAM(S): 20 TABLET, DELAYED RELEASE ORAL at 05:59

## 2018-10-26 RX ADMIN — ONDANSETRON 4 MILLIGRAM(S): 8 TABLET, FILM COATED ORAL at 23:41

## 2018-10-26 RX ADMIN — Medication 400 MILLIGRAM(S): at 23:40

## 2018-10-26 RX ADMIN — ONDANSETRON 4 MILLIGRAM(S): 8 TABLET, FILM COATED ORAL at 17:55

## 2018-10-26 RX ADMIN — ONDANSETRON 4 MILLIGRAM(S): 8 TABLET, FILM COATED ORAL at 11:56

## 2018-10-26 RX ADMIN — Medication 400 MILLIGRAM(S): at 03:00

## 2018-10-26 RX ADMIN — HYDROMORPHONE HYDROCHLORIDE 0.5 MILLIGRAM(S): 2 INJECTION INTRAMUSCULAR; INTRAVENOUS; SUBCUTANEOUS at 11:27

## 2018-10-26 RX ADMIN — Medication 400 MILLIGRAM(S): at 15:27

## 2018-10-26 RX ADMIN — Medication 1000 MILLIGRAM(S): at 15:45

## 2018-10-26 RX ADMIN — ENOXAPARIN SODIUM 40 MILLIGRAM(S): 100 INJECTION SUBCUTANEOUS at 22:09

## 2018-10-26 RX ADMIN — Medication 100 MILLIGRAM(S): at 17:53

## 2018-10-26 RX ADMIN — Medication 200 MILLIGRAM(S): at 06:00

## 2018-10-26 RX ADMIN — Medication 1 TABLET(S): at 22:09

## 2018-10-26 RX ADMIN — Medication 1000 MILLIGRAM(S): at 03:15

## 2018-10-26 RX ADMIN — ENOXAPARIN SODIUM 40 MILLIGRAM(S): 100 INJECTION SUBCUTANEOUS at 08:56

## 2018-10-26 RX ADMIN — Medication 100 MILLIGRAM(S): at 23:41

## 2018-10-26 NOTE — PROGRESS NOTE ADULT - SUBJECTIVE AND OBJECTIVE BOX
Chief Complaint:        INTERVAL HPI/OVERNIGHT EVENTS:    nausea   fever yesterday  flatus scant    MEDICATIONS  (STANDING):  enoxaparin Injectable 40 milliGRAM(s) SubCutaneous every 12 hours  lactated ringers. 1000 milliLiter(s) (100 mL/Hr) IV Continuous <Continuous>  ondansetron Injectable 4 milliGRAM(s) IV Push every 6 hours  pantoprazole  Injectable 40 milliGRAM(s) IV Push two times a day    MEDICATIONS  (PRN):  acetaminophen  IVPB .. 1000 milliGRAM(s) IV Intermittent every 6 hours PRN Mild Pain (1 - 3)  famotidine Injectable 20 milliGRAM(s) IV Push once PRN acid reflux  HYDROmorphone  Injectable 0.5 milliGRAM(s) IV Push every 4 hours PRN Severe Pain (7 - 10)  hyoscyamine SL 0.125 milliGRAM(s) SubLingual every 6 hours PRN n/v  ibuprofen IVPB .. 800 milliGRAM(s) IV Intermittent every 6 hours PRN Moderate Pain (4 - 6)            Vital Signs Last 24 Hrs  T(C): 36.8 (26 Oct 2018 07:48), Max: 38.4 (25 Oct 2018 23:06)  T(F): 98.2 (26 Oct 2018 07:48), Max: 101.1 (25 Oct 2018 23:06)  HR: 85 (26 Oct 2018 07:48) (72 - 98)  BP: 100/79 (26 Oct 2018 07:48) (100/79 - 134/82)  BP(mean): --  RR: 18 (26 Oct 2018 07:48) (14 - 18)  SpO2: 96% (26 Oct 2018 07:48) (90% - 97%)    Physical exam:  abd soft, tender. no rebound bs+  cv s1s2  chest air entry  ext no edema or rash          LABS:                        10.1   9.29  )-----------( 170      ( 26 Oct 2018 08:11 )             30.8     10-26    x   |  104  |  6<L>  ----------------------------<  101<H>  3.5   |  28  |  0.82    Ca    8.6      26 Oct 2018 08:11            RADIOLOGY & ADDITIONAL TESTS:

## 2018-10-26 NOTE — PROGRESS NOTE ADULT - ASSESSMENT
53 year old Female with lap band erosion now POD # 2s/p Laparoscopic removal of adjustable gastric band and subcutaneous gastric port device and repair of gastrotomy. She is doing well and eager to have liquids. If nausea controlled, she will have VE today. For antiemetic, can increase Zofran to 8 mg. Will change antibiotics.

## 2018-10-26 NOTE — PROGRESS NOTE ADULT - SUBJECTIVE AND OBJECTIVE BOX
Pre-Op Dx: Lap band erosion  Procedure: Laparoscopic removal of adjustable gastric band and subcutaneous gastric port device  Surgeon: Jose    HPI:   53 year old Female with lap band erosion now s/p Laparoscopic removal of adjustable gastric band and subcutaneous gastric port device and repair of gastrotomy POD # 2.  Patient is ambulating on the floor and utilizing incentive spirometry. She remains NPO and is urinating well. Overnight, patient had low grade fever and had complaint of extremity swelling and IV fluid rate was decreased to 100 ml/hr.  Minimal incisional discomfort. She also complains of nausea, but denies vomiting. Yesterday, protonix was increased to twice daily. Pt seen and examined at the bedside.     VITALS:  Vital Signs Last 24 Hrs  T(C): 36.8 (26 Oct 2018 07:48), Max: 38.4 (25 Oct 2018 23:06)  T(F): 98.2 (26 Oct 2018 07:48), Max: 101.1 (25 Oct 2018 23:06)  HR: 85 (26 Oct 2018 07:48) (83 - 98)  BP: 100/79 (26 Oct 2018 07:48) (100/79 - 134/82)  BP(mean): --  RR: 18 (26 Oct 2018 07:48) (14 - 18)  SpO2: 96% (26 Oct 2018 07:48) (90% - 96%)    10-25 @ 07:01  -  10-26 @ 07:00  --------------------------------------------------------  IN: 2150 mL / OUT: 2150 mL / NET: 0 mL    LABS:                        10.1   9.29  )-----------( 170      ( 26 Oct 2018 08:11 )             30.8     10-26    144  |  104  |  6<L>  ----------------------------<  101<H>  3.5   |  28  |  0.82    Ca    8.6      26 Oct 2018 08:11      Physical Exam:  General: A/O x 3, NAD, resting comfortably in bed  Abdominal: soft, ND, nontender to palpation, incisions C/D/I  Extremities: mild swelling of left hand (no IV in that arm), all other extremities have no edema, no calf tenderness B/L

## 2018-10-26 NOTE — PROGRESS NOTE ADULT - ATTENDING COMMENTS
Agree with above.  Patient seen and examined.  Good urine output, ambulating.  Low grade temp overnight, afebrile this am, complaining of nausea in am, VE negative.  Start sips of clears as tolerated.  Zofran, levsin for nausea.  Possible d/c over weekend if tolerating clears and remains afebrile.  Will discharge with short course of antibiotics and followup in the office next week. Agree with above.  Patient seen and examined.  Good urine output, ambulating.  Low grade temp overnight, afebrile this am, abd soft nontender, incisions clean. Complaining of nausea in am now improved, VE negative.  Start sips of clears as tolerated.  Zofran, levsin for nausea.  Possible d/c over weekend if tolerating clears and remains afebrile.  Will discharge with short course of antibiotics and followup in the office next week.

## 2018-10-26 NOTE — PROGRESS NOTE ADULT - PROBLEM SELECTOR PLAN 1
Cont GI / DVT prophylaxis.   Cont  OOB / ambulation on floor / incentive spirometry.  Start new antibiotics  VE today and possibly start bariatric clear diet if there is no evidence of extravasation.

## 2018-10-26 NOTE — PROGRESS NOTE ADULT - ASSESSMENT
post op day 2 -removal of eroded lap band.    intra op EGD confirmed erosion and watertesting after removal.      VSS abd soft, obese. mildy tender         cv s1s2        chest air entry  labs reviewed, hct down    a/p Post op day 2 Lap removal of eroded band, HODAN    intra-op EGD performed.    cont w/ Protonix daily, zofran prn   c/o nausea possibly related to cipro and/or flagyl.    contrast study today to assess for extravasation-     cont w/ ambulating  -diet per surgery, await contrast study

## 2018-10-27 RX ORDER — ACETAMINOPHEN 500 MG
1000 TABLET ORAL EVERY 6 HOURS
Qty: 0 | Refills: 0 | Status: COMPLETED | OUTPATIENT
Start: 2018-10-27 | End: 2018-10-27

## 2018-10-27 RX ORDER — ACETAMINOPHEN 500 MG
1000 TABLET ORAL ONCE
Qty: 0 | Refills: 0 | Status: COMPLETED | OUTPATIENT
Start: 2018-10-27 | End: 2018-10-27

## 2018-10-27 RX ADMIN — Medication 1 TABLET(S): at 16:26

## 2018-10-27 RX ADMIN — ENOXAPARIN SODIUM 40 MILLIGRAM(S): 100 INJECTION SUBCUTANEOUS at 08:33

## 2018-10-27 RX ADMIN — ONDANSETRON 4 MILLIGRAM(S): 8 TABLET, FILM COATED ORAL at 17:42

## 2018-10-27 RX ADMIN — Medication 400 MILLIGRAM(S): at 12:50

## 2018-10-27 RX ADMIN — Medication 1 TABLET(S): at 22:59

## 2018-10-27 RX ADMIN — Medication 1000 MILLIGRAM(S): at 00:11

## 2018-10-27 RX ADMIN — PANTOPRAZOLE SODIUM 40 MILLIGRAM(S): 20 TABLET, DELAYED RELEASE ORAL at 17:42

## 2018-10-27 RX ADMIN — HYDROMORPHONE HYDROCHLORIDE 0.5 MILLIGRAM(S): 2 INJECTION INTRAMUSCULAR; INTRAVENOUS; SUBCUTANEOUS at 20:09

## 2018-10-27 RX ADMIN — HYDROMORPHONE HYDROCHLORIDE 0.5 MILLIGRAM(S): 2 INJECTION INTRAMUSCULAR; INTRAVENOUS; SUBCUTANEOUS at 20:30

## 2018-10-27 RX ADMIN — Medication 1000 MILLIGRAM(S): at 17:46

## 2018-10-27 RX ADMIN — Medication 100 MILLIGRAM(S): at 16:27

## 2018-10-27 RX ADMIN — HYDROMORPHONE HYDROCHLORIDE 0.5 MILLIGRAM(S): 2 INJECTION INTRAMUSCULAR; INTRAVENOUS; SUBCUTANEOUS at 08:45

## 2018-10-27 RX ADMIN — Medication 1000 MILLIGRAM(S): at 12:51

## 2018-10-27 RX ADMIN — Medication 1 TABLET(S): at 08:23

## 2018-10-27 RX ADMIN — HYDROMORPHONE HYDROCHLORIDE 0.5 MILLIGRAM(S): 2 INJECTION INTRAMUSCULAR; INTRAVENOUS; SUBCUTANEOUS at 03:37

## 2018-10-27 RX ADMIN — ONDANSETRON 4 MILLIGRAM(S): 8 TABLET, FILM COATED ORAL at 06:21

## 2018-10-27 RX ADMIN — HYDROMORPHONE HYDROCHLORIDE 0.5 MILLIGRAM(S): 2 INJECTION INTRAMUSCULAR; INTRAVENOUS; SUBCUTANEOUS at 08:33

## 2018-10-27 RX ADMIN — Medication 100 MILLIGRAM(S): at 08:24

## 2018-10-27 RX ADMIN — SODIUM CHLORIDE 100 MILLILITER(S): 9 INJECTION, SOLUTION INTRAVENOUS at 20:09

## 2018-10-27 RX ADMIN — ONDANSETRON 4 MILLIGRAM(S): 8 TABLET, FILM COATED ORAL at 12:50

## 2018-10-27 RX ADMIN — Medication 400 MILLIGRAM(S): at 22:58

## 2018-10-27 RX ADMIN — ENOXAPARIN SODIUM 40 MILLIGRAM(S): 100 INJECTION SUBCUTANEOUS at 21:47

## 2018-10-27 RX ADMIN — HYDROMORPHONE HYDROCHLORIDE 0.5 MILLIGRAM(S): 2 INJECTION INTRAMUSCULAR; INTRAVENOUS; SUBCUTANEOUS at 03:22

## 2018-10-27 RX ADMIN — Medication 1000 MILLIGRAM(S): at 23:50

## 2018-10-27 RX ADMIN — Medication 400 MILLIGRAM(S): at 17:42

## 2018-10-27 RX ADMIN — PANTOPRAZOLE SODIUM 40 MILLIGRAM(S): 20 TABLET, DELAYED RELEASE ORAL at 06:21

## 2018-10-27 NOTE — PROGRESS NOTE ADULT - ASSESSMENT
S/P laparoscopic removal of gastric band and port secondary to erosion.  Persistent nausea.  Will continue Zofran PRN.  Will continue to monitor and encourage PO intake as tolerated.  Encourage ambulation and incentive spirometry  Continue DVT and GI Prophylaxis.  VS remains stable,  not toxic appearing and WBC remains normal.  Unlikely any post-operative complications or intraabdominal collections.  Will reassess in AM.  If feeling better, may be stable for discharge to home.

## 2018-10-27 NOTE — PROGRESS NOTE ADULT - SUBJECTIVE AND OBJECTIVE BOX
S/P lap band removal secondary to gastric band erosion.  Complains of persistent nausea.  Seems to be tolerating liquids.  Reports passing flatus and urinating frequently.    Vital Signs Last 24 Hrs  T(C): 37.2 (27 Oct 2018 07:59), Max: 37.3 (26 Oct 2018 15:30)  T(F): 98.9 (27 Oct 2018 07:59), Max: 99.1 (26 Oct 2018 15:30)  HR: 78 (27 Oct 2018 07:59) (78 - 88)  BP: 152/88 (27 Oct 2018 07:59) (138/89 - 152/88)  BP(mean): --  RR: 16 (27 Oct 2018 07:59) (16 - 18)  SpO2: 93% (27 Oct 2018 07:59) (93% - 94%)    10-26 @ 07:01  -  10-27 @ 07:00  --------------------------------------------------------  IN: 1600 mL / OUT: 400 mL / NET: 1200 mL                              10.1   9.29  )-----------( 170      ( 26 Oct 2018 08:11 )             30.8                         10.4   8.21  )-----------( 162      ( 25 Oct 2018 08:00 )             31.4       10-26    144  |  104  |  6<L>  ----------------------------<  101<H>  3.5   |  28  |  0.82    Ca    8.6      26 Oct 2018 08:11        Physical Exam:  Awake alert and oriented x3 in no acute distress. NCAT, PERRLA, EOMI  Lungs clear bilaterally without wheezes rhonchi or rales.  Regular rate and rhythm  Abdomen soft, nontender, nondistended, positive bowel sounds in all 4 quadrants. No evidence of hernia or masses. Surgical incisions remained well approximated and healing appropriately without erythema, induration or fluctuance.  Minimal ecchymosis at port extractions site. Steri-strips remained clean dry and intact  Extremities without edema or tenderness.

## 2018-10-28 VITALS
HEART RATE: 78 BPM | RESPIRATION RATE: 16 BRPM | DIASTOLIC BLOOD PRESSURE: 92 MMHG | OXYGEN SATURATION: 92 % | SYSTOLIC BLOOD PRESSURE: 153 MMHG | TEMPERATURE: 99 F

## 2018-10-28 LAB
ANION GAP SERPL CALC-SCNC: 12 MMOL/L — SIGNIFICANT CHANGE UP (ref 5–17)
BUN SERPL-MCNC: 8 MG/DL — SIGNIFICANT CHANGE UP (ref 7–23)
CALCIUM SERPL-MCNC: 8.4 MG/DL — SIGNIFICANT CHANGE UP (ref 8.4–10.5)
CHLORIDE SERPL-SCNC: 105 MMOL/L — SIGNIFICANT CHANGE UP (ref 96–108)
CO2 SERPL-SCNC: 25 MMOL/L — SIGNIFICANT CHANGE UP (ref 22–31)
CREAT SERPL-MCNC: 0.65 MG/DL — SIGNIFICANT CHANGE UP (ref 0.5–1.3)
GLUCOSE SERPL-MCNC: 75 MG/DL — SIGNIFICANT CHANGE UP (ref 70–99)
HCT VFR BLD CALC: 28.7 % — LOW (ref 34.5–45)
HGB BLD-MCNC: 9.4 G/DL — LOW (ref 11.5–15.5)
MCHC RBC-ENTMCNC: 29.8 PG — SIGNIFICANT CHANGE UP (ref 27–34)
MCHC RBC-ENTMCNC: 32.8 GM/DL — SIGNIFICANT CHANGE UP (ref 32–36)
MCV RBC AUTO: 91.1 FL — SIGNIFICANT CHANGE UP (ref 80–100)
NRBC # BLD: 0 /100 WBCS — SIGNIFICANT CHANGE UP (ref 0–0)
PLATELET # BLD AUTO: 186 K/UL — SIGNIFICANT CHANGE UP (ref 150–400)
POTASSIUM SERPL-MCNC: 3.3 MMOL/L — LOW (ref 3.5–5.3)
POTASSIUM SERPL-SCNC: 3.3 MMOL/L — LOW (ref 3.5–5.3)
RBC # BLD: 3.15 M/UL — LOW (ref 3.8–5.2)
RBC # FLD: 12.3 % — SIGNIFICANT CHANGE UP (ref 10.3–14.5)
SODIUM SERPL-SCNC: 142 MMOL/L — SIGNIFICANT CHANGE UP (ref 135–145)
WBC # BLD: 7.08 K/UL — SIGNIFICANT CHANGE UP (ref 3.8–10.5)
WBC # FLD AUTO: 7.08 K/UL — SIGNIFICANT CHANGE UP (ref 3.8–10.5)

## 2018-10-28 PROCEDURE — 94664 DEMO&/EVAL PT USE INHALER: CPT

## 2018-10-28 PROCEDURE — C1889: CPT

## 2018-10-28 PROCEDURE — 88302 TISSUE EXAM BY PATHOLOGIST: CPT

## 2018-10-28 PROCEDURE — 81025 URINE PREGNANCY TEST: CPT

## 2018-10-28 PROCEDURE — 80048 BASIC METABOLIC PNL TOTAL CA: CPT

## 2018-10-28 PROCEDURE — 74220 X-RAY XM ESOPHAGUS 1CNTRST: CPT

## 2018-10-28 PROCEDURE — 85027 COMPLETE CBC AUTOMATED: CPT

## 2018-10-28 PROCEDURE — 36415 COLL VENOUS BLD VENIPUNCTURE: CPT

## 2018-10-28 RX ORDER — POTASSIUM CHLORIDE 20 MEQ
40 PACKET (EA) ORAL ONCE
Qty: 0 | Refills: 0 | Status: COMPLETED | OUTPATIENT
Start: 2018-10-28 | End: 2018-10-28

## 2018-10-28 RX ADMIN — ONDANSETRON 4 MILLIGRAM(S): 8 TABLET, FILM COATED ORAL at 00:22

## 2018-10-28 RX ADMIN — ONDANSETRON 4 MILLIGRAM(S): 8 TABLET, FILM COATED ORAL at 06:23

## 2018-10-28 RX ADMIN — Medication 40 MILLIEQUIVALENT(S): at 13:54

## 2018-10-28 RX ADMIN — ENOXAPARIN SODIUM 40 MILLIGRAM(S): 100 INJECTION SUBCUTANEOUS at 08:23

## 2018-10-28 RX ADMIN — HYDROMORPHONE HYDROCHLORIDE 0.5 MILLIGRAM(S): 2 INJECTION INTRAMUSCULAR; INTRAVENOUS; SUBCUTANEOUS at 09:46

## 2018-10-28 RX ADMIN — Medication 100 MILLIGRAM(S): at 00:22

## 2018-10-28 RX ADMIN — Medication 100 MILLIGRAM(S): at 08:23

## 2018-10-28 RX ADMIN — ONDANSETRON 4 MILLIGRAM(S): 8 TABLET, FILM COATED ORAL at 12:27

## 2018-10-28 RX ADMIN — HYDROMORPHONE HYDROCHLORIDE 0.5 MILLIGRAM(S): 2 INJECTION INTRAMUSCULAR; INTRAVENOUS; SUBCUTANEOUS at 01:30

## 2018-10-28 RX ADMIN — PANTOPRAZOLE SODIUM 40 MILLIGRAM(S): 20 TABLET, DELAYED RELEASE ORAL at 06:23

## 2018-10-28 RX ADMIN — Medication 1 TABLET(S): at 06:10

## 2018-10-28 RX ADMIN — HYDROMORPHONE HYDROCHLORIDE 0.5 MILLIGRAM(S): 2 INJECTION INTRAMUSCULAR; INTRAVENOUS; SUBCUTANEOUS at 02:30

## 2018-10-28 RX ADMIN — HYDROMORPHONE HYDROCHLORIDE 0.5 MILLIGRAM(S): 2 INJECTION INTRAMUSCULAR; INTRAVENOUS; SUBCUTANEOUS at 09:56

## 2018-10-28 NOTE — PROGRESS NOTE ADULT - ASSESSMENT
Tolerating liquid diet following Lap band removal.  No signs of sepsis.  Remains hemodynamically stable.  WBC normal.  Hypokalemic today with K 3.3  will replace with PO K+  Discharge to home today.  Antibiotics prescribed until 10/31 (Augmentin), already sent to pharmacy.  F/U with Dr Garcia this week in office.  Continue fluid intake, remain well hydrated.  Daily MVI

## 2018-10-28 NOTE — PROGRESS NOTE ADULT - SUBJECTIVE AND OBJECTIVE BOX
s/p Lap Band removal secondary to erosion.  Doing well.  Denies nausea or vomiting.  Tolerating liquid diet.      Vital Signs Last 24 Hrs  T(C): 37 (28 Oct 2018 07:57), Max: 37.3 (27 Oct 2018 15:06)  T(F): 98.6 (28 Oct 2018 07:57), Max: 99.1 (27 Oct 2018 15:06)  HR: 78 (28 Oct 2018 07:57) (72 - 80)  BP: 153/92 (28 Oct 2018 07:57) (130/79 - 153/92)  BP(mean): --  RR: 16 (28 Oct 2018 07:57) (14 - 16)  SpO2: 92% (28 Oct 2018 07:57) (92% - 98%)    10-27 @ 07:01  -  10-28 @ 07:00  --------------------------------------------------------  IN: 1100 mL / OUT: 0 mL / NET: 1100 mL                              9.4    7.08  )-----------( 186      ( 28 Oct 2018 06:27 )             28.7                         10.1   9.29  )-----------( 170      ( 26 Oct 2018 08:11 )             30.8       10-28    142  |  105  |  8   ----------------------------<  75  3.3<L>   |  25  |  0.65    Ca    8.4      28 Oct 2018 06:27        Physical Exam:  Awake alert and oriented x3 in no acute distress. NCAT, PERRLA, EOMI  Lungs clear bilaterally without wheezes rhonchi or rales.  Regular rate and rhythm  Abdomen soft, nontender, nondistended, positive bowel sounds in all 4 quadrants. No evidence of hernia or masses. Surgical incisions remained well approximated and healing appropriately without erythema, induration or fluctuance. Dressings remained clean dry and intact  Extremities without edema or tenderness.

## 2018-10-29 ENCOUNTER — MESSAGE (OUTPATIENT)
Age: 53
End: 2018-10-29

## 2018-10-30 ENCOUNTER — APPOINTMENT (OUTPATIENT)
Dept: BARIATRICS | Facility: CLINIC | Age: 53
End: 2018-10-30
Payer: COMMERCIAL

## 2018-10-30 VITALS
HEIGHT: 62 IN | DIASTOLIC BLOOD PRESSURE: 82 MMHG | WEIGHT: 182.98 LBS | HEART RATE: 87 BPM | OXYGEN SATURATION: 97 % | BODY MASS INDEX: 33.67 KG/M2 | SYSTOLIC BLOOD PRESSURE: 122 MMHG

## 2018-10-30 DIAGNOSIS — E66.9 OBESITY, UNSPECIFIED: ICD-10-CM

## 2018-10-30 DIAGNOSIS — L76.82 OTHER POSTPROCEDURAL COMPLICATIONS OF SKIN AND SUBCUTANEOUS TISSUE: ICD-10-CM

## 2018-10-30 DIAGNOSIS — I10 ESSENTIAL (PRIMARY) HYPERTENSION: ICD-10-CM

## 2018-10-30 DIAGNOSIS — Z98.84 BARIATRIC SURGERY STATUS: ICD-10-CM

## 2018-10-30 PROCEDURE — 99024 POSTOP FOLLOW-UP VISIT: CPT

## 2018-11-01 PROBLEM — L76.82 INCISIONAL PAIN: Status: ACTIVE | Noted: 2018-11-01

## 2018-11-01 RX ORDER — OXYCODONE AND ACETAMINOPHEN 5; 325 MG/1; MG/1
5-325 TABLET ORAL
Qty: 10 | Refills: 0 | Status: ACTIVE | COMMUNITY
Start: 2018-11-01 | End: 1900-01-01

## 2018-11-06 ENCOUNTER — APPOINTMENT (OUTPATIENT)
Dept: BARIATRICS | Facility: CLINIC | Age: 53
End: 2018-11-06
Payer: COMMERCIAL

## 2018-11-06 VITALS
DIASTOLIC BLOOD PRESSURE: 80 MMHG | WEIGHT: 164.46 LBS | HEIGHT: 62 IN | SYSTOLIC BLOOD PRESSURE: 122 MMHG | BODY MASS INDEX: 30.26 KG/M2 | HEART RATE: 91 BPM | OXYGEN SATURATION: 98 %

## 2018-11-06 PROCEDURE — 99024 POSTOP FOLLOW-UP VISIT: CPT

## 2018-11-08 RX ORDER — OXYCODONE AND ACETAMINOPHEN 5; 325 MG/1; MG/1
5-325 TABLET ORAL 3 TIMES DAILY
Qty: 5 | Refills: 0 | Status: ACTIVE | COMMUNITY
Start: 2018-11-08 | End: 1900-01-01

## 2018-11-12 ENCOUNTER — APPOINTMENT (OUTPATIENT)
Dept: BARIATRICS | Facility: CLINIC | Age: 53
End: 2018-11-12
Payer: COMMERCIAL

## 2018-11-12 VITALS
BODY MASS INDEX: 30.91 KG/M2 | WEIGHT: 167.99 LBS | DIASTOLIC BLOOD PRESSURE: 80 MMHG | OXYGEN SATURATION: 98 % | HEIGHT: 62 IN | HEART RATE: 75 BPM | SYSTOLIC BLOOD PRESSURE: 120 MMHG

## 2018-11-12 DIAGNOSIS — Z98.84 BARIATRIC SURGERY STATUS: ICD-10-CM

## 2018-11-12 DIAGNOSIS — K95.09 OTHER COMPLICATIONS OF GASTRIC BAND PROCEDURE: ICD-10-CM

## 2018-11-12 PROCEDURE — 99024 POSTOP FOLLOW-UP VISIT: CPT

## 2018-11-12 RX ORDER — AMOXICILLIN 400 MG/5ML
FOR SUSPENSION ORAL
Refills: 0 | Status: COMPLETED | COMMUNITY

## 2018-11-12 RX ORDER — PANTOPRAZOLE SODIUM 40 MG/1
GRANULE, DELAYED RELEASE ORAL
Refills: 0 | Status: ACTIVE | COMMUNITY

## 2018-11-12 RX ORDER — OXYCODONE AND ACETAMINOPHEN TABLETS 10; 300 MG/1; MG/1
TABLET ORAL
Refills: 0 | Status: ACTIVE | COMMUNITY

## 2018-11-15 RX ORDER — OXYCODONE AND ACETAMINOPHEN 5; 325 MG/1; MG/1
5-325 TABLET ORAL EVERY 6 HOURS
Qty: 10 | Refills: 0 | Status: ACTIVE | COMMUNITY
Start: 2018-11-15 | End: 1900-01-01

## 2018-11-26 ENCOUNTER — APPOINTMENT (OUTPATIENT)
Dept: BARIATRICS | Facility: CLINIC | Age: 53
End: 2018-11-26
Payer: COMMERCIAL

## 2018-11-26 VITALS
BODY MASS INDEX: 31.16 KG/M2 | WEIGHT: 169.31 LBS | DIASTOLIC BLOOD PRESSURE: 80 MMHG | HEIGHT: 62 IN | HEART RATE: 77 BPM | SYSTOLIC BLOOD PRESSURE: 120 MMHG | OXYGEN SATURATION: 98 %

## 2018-11-26 DIAGNOSIS — Z87.898 PERSONAL HISTORY OF OTHER SPECIFIED CONDITIONS: ICD-10-CM

## 2018-11-26 DIAGNOSIS — E78.5 HYPERLIPIDEMIA, UNSPECIFIED: ICD-10-CM

## 2018-11-26 DIAGNOSIS — R63.5 ABNORMAL WEIGHT GAIN: ICD-10-CM

## 2018-11-26 PROCEDURE — 99214 OFFICE O/P EST MOD 30 MIN: CPT | Mod: 24

## 2019-01-10 PROBLEM — Z98.84 HISTORY OF REMOVAL OF LAPAROSCOPIC GASTRIC BANDING DEVICE: Status: ACTIVE | Noted: 2018-11-01

## 2019-01-10 NOTE — HISTORY OF PRESENT ILLNESS
[Procedure: ___] : Procedure performed: [unfilled]  [Date of Surgery: ___] : Date of Surgery:   [unfilled] [Surgeon Name:   ___] : Surgeon Name: Dr. WHYTE [Pre-Op Weight ___] : Pre-op weight was [unfilled] lbs [de-identified] : 53 year old F 1 WEEK s/p removal of lap and and port secondary to lap band erosion. Pt saw GI - underwent an upper EGD - which confirmed a lap band erosion. Gained 9 lbs  since last visit.Reports minimal incisional discomfort- right incisional discomfort.Reports minimal nausea. Tolerating advancement of diet without any issues.  Pt states she is consuming a sufficient amount of zero calorie liquid per day. Plan to start taking MVI daily. No change in BM. Denies fever or chills. Occasional reflux symptoms. Exercising regularly at home. Plan to start strength training at 6 weeks post op.  [de-identified] : S/P LAP BAND SURGERY APS WITH GREATER CURVATURE PLICATION , Date of Surgery: 6/2/2014, Surgeon Name: Dr. BRUNO  . Pre-op weight was 218 lbs. \par

## 2019-01-10 NOTE — PHYSICAL EXAM
[Obese, well nourished, in no acute distress] : obese, well nourished, in no acute distress [Normal] : full range of motion and no deformities appreciated [de-identified] : MINIMAL INCISIONAL DISCOMFORT UPON PALPATION. NO ERYTHEMA NO SWELLING NOTED. INCISION SITES INTACT AND HEALING WELL.

## 2019-01-10 NOTE — REVIEW OF SYSTEMS
[Recent Change In Weight] : ~T recent weight change [Abdominal Pain] : abdominal pain [Reflux/Heartburn] : reflux/heartburn [Negative] : Psychiatric [Fever] : no fever [Chills] : no chills [Dysphagia] : no dysphagia [Hoarseness] : no hoarseness [Chest Pain] : no chest pain [Palpitations] : no palpitations [Shortness Of Breath] : no shortness of breath [Wheezing] : no wheezing [SOB on Exertion] : no shortness of breath during exertion [Vomiting] : no vomiting [Constipation] : no constipation [Diarrhea] : no diarrhea [FreeTextEntry2] : weight gain  [FreeTextEntry7] : loose BM  minimal incisional discomfort. mild reflux symptoms.

## 2019-07-23 NOTE — ASU PREOP CHECKLIST - HEIGHT IN FEET
5 Complex Repair And Graft Additional Text (Will Appearing After The Standard Complex Repair Text): The complex repair was not sufficient to completely close the primary defect. The remaining additional defect was repaired with the graft mentioned below.

## 2019-08-20 NOTE — H&P PST ADULT - NS MD HP HEP C STATUS
[id number]  [phone]    If symptoms worsen    Discharge medications:  Current Discharge Medication List        (Please note that portions of this note may have been completed with a voice recognition program. Efforts were made to edit the dictations but occasionally words are mis-transcribed.)    Kristina Martel,   08/20/19 [id number]
Negative

## 2021-08-15 NOTE — H&P PST ADULT - VENOUS THROMBOEMBOLISM CURRENT LABS/TEST RESULTS
Implemented All Universal Safety Interventions:  [place] to call system. Call bell, personal items and telephone within reach. Instruct patient to call for assistance. Room bathroom lighting operational. Non-slip footwear when patient is off stretcher. Physically safe environment: no spills, clutter or unnecessary equipment. Stretcher in lowest position, wheels locked, appropriate side rails in place.
none

## 2023-12-06 NOTE — ANESTHESIA FOLLOW-UP NOTE - NSEVALATION_GEN_ALL_CORE
No apparent complications or complaints regarding anesthesia care at this time/All questions were answered
Yes